# Patient Record
(demographics unavailable — no encounter records)

---

## 2024-10-24 NOTE — PHYSICAL EXAM

## 2024-10-24 NOTE — HISTORY OF PRESENT ILLNESS
[Normal] : Normal [No] : No cigarette smoke exposure [Water heater temperature set at <120 degrees F] : Water heater temperature set at <120 degrees F [Car seat in back seat] : Car seat in back seat [Carbon Monoxide Detectors] : Carbon monoxide detectors [Smoke Detectors] : Smoke detectors [Supervised outdoor play] : Supervised outdoor play [Yes] : Patient goes to dentist yearly [FreeTextEntry7] :  Patient is here for wcc. [FreeTextEntry1] : g and t  ps 122   got flu shot  got prednisone for a few days albuterol and  budesonide sees specialist in [place]  open adoption  meds only when sick no regular meds

## 2024-10-24 NOTE — DISCUSSION/SUMMARY
[Normal Growth] : growth [Normal Development] : development  [No Elimination Concerns] : elimination [Continue Regimen] : feeding [No Skin Concerns] : skin [Normal Sleep Pattern] : sleep [None] : no medical problems [School Readiness] : school readiness [Mental Health] : mental health [Nutrition and Physical Activity] : nutrition and physical activity [Oral Health] : oral health [Safety] : safety [Anticipatory Guidance Given] : Anticipatory guidance addressed as per the history of present illness section [No Vaccines] : no vaccines needed [No Medications] : ~He/She~ is not on any medications [] : The components of the vaccine(s) to be administered today are listed in the plan of care. The disease(s) for which the vaccine(s) are intended to prevent and the risks have been discussed with the caretaker.  The risks are also included in the appropriate vaccination information statements which have been provided to the patient's caregiver.  The caregiver has given consent to vaccinate. [FreeTextEntry1] : Continue balanced diet with all food groups. Brush teeth twice a day with toothbrush. Recommend visit to dentist. As per car seat 's guidelines, use foward-facing booster seat until child reaches highest weight/height for seat. Child needs to ride in a belt-positioning booster seat until  4 feet 9 inches has been reached and are between 8 and 12 years of age. Put child to sleep in own bed. Help child to maintain consistent daily routines and sleep schedule.  discussed. Ensure home is safe. Teach child about personal safety. Use consistent, positive discipline. Read aloud to child. Limit screen time to no more than 2 hours per day. Return 1 year for routine well child check. vaccines not given due to steroid use within one week   wart on finger  will freeze with cry opreicpate for 5 seconds

## 2024-11-18 NOTE — HISTORY OF PRESENT ILLNESS
[de-identified] : Cough  [FreeTextEntry6] : Olga is a 4 y.o female presenting with cough, sore throat, nausea, abdominal pain and rash. Symptoms started 4 days ago- with fever and other symptoms have emerged since then. Rash is rough and on chest- started one day ago.

## 2024-11-18 NOTE — PHYSICAL EXAM
[Clear Rhinorrhea] : clear rhinorrhea [Erythematous Oropharynx] : erythematous oropharynx [Vesicles] : vesicles present [Exudate] : exudate [Palate petechiae] : palate petechiae [NL] : moves all extremities x4, warm, well perfused x4 [de-identified] : + rough textured papular rash on chest

## 2024-11-18 NOTE — DISCUSSION/SUMMARY
[FreeTextEntry1] : Olga is a 4 y.o female presenting with cough + other symptoms. Physical exam with pharyngitis and sandpaper rash on chest. Rapid strep testing negative but unable to get optimal swab( patient unable to tolerate testing)- throat culture sent out and will follow up. Discussed diagnosis of pharyngitis and starting Amoxicillin until throat culture results given high clinical suspicion of GAS and mother endorsed understanding. RTO as needed- will follow up with throat culture results.

## 2024-11-18 NOTE — REVIEW OF SYSTEMS
[Fever] : fever [Sore Throat] : sore throat [Cough] : cough [Appetite Changes] : appetite changes [Abdominal Pain] : abdominal pain [Rash] : rash [Negative] : Genitourinary

## 2025-01-22 NOTE — HISTORY OF PRESENT ILLNESS
[de-identified] : Immunizations [FreeTextEntry6] : Olga is a 5 y.o female presenting for 4 yr vaccines. No other symptoms.

## 2025-01-22 NOTE — DISCUSSION/SUMMARY
[FreeTextEntry1] : Olga is a 5 y.o female presenting for 4 yr vaccines. MMR, Varicella, Dtap and Polio vaccines administered with no issues. RTO as needed.  [] : The components of the vaccine(s) to be administered today are listed in the plan of care. The disease(s) for which the vaccine(s) are intended to prevent and the risks have been discussed with the caretaker.  The risks are also included in the appropriate vaccination information statements which have been provided to the patient's caregiver.  The caregiver has given consent to vaccinate.

## 2025-02-12 NOTE — HISTORY OF PRESENT ILLNESS
[de-identified] : Rash  [FreeTextEntry6] : Olga is a 5 y.o female presenting for rash on face and body. With red rash- not itchy on face and now spreading to body. Some congestion. No fever or other symptoms.

## 2025-02-12 NOTE — PHYSICAL EXAM
[NL] : moves all extremities x4, warm, well perfused x4 [de-identified] : + blanching maculopapular rash on body + face

## 2025-02-12 NOTE — DISCUSSION/SUMMARY
[FreeTextEntry1] : Olga is a 5 y.o female presenting for rash. Physical exam notable for viral exanthem. Discussed that most likely etiology is viral URI. Discussed supportive care and RTO precautions. RTO as needed.

## 2025-02-12 NOTE — PHYSICAL EXAM
[NL] : moves all extremities x4, warm, well perfused x4 [de-identified] : + blanching maculopapular rash on body + face

## 2025-02-12 NOTE — HISTORY OF PRESENT ILLNESS
[de-identified] : Rash  [FreeTextEntry6] : Olga is a 5 y.o female presenting for rash on face and body. With red rash- not itchy on face and now spreading to body. Some congestion. No fever or other symptoms.

## 2025-03-15 NOTE — DISCUSSION/SUMMARY
[FreeTextEntry1] : Olga is a 5 y.o female presenting for follow up skin and hepatitis B vaccine. Discussed gentle skin care- no focal findings. Hepatitis B vaccine administered with no issues.  [] : The components of the vaccine(s) to be administered today are listed in the plan of care. The disease(s) for which the vaccine(s) are intended to prevent and the risks have been discussed with the caretaker.  The risks are also included in the appropriate vaccination information statements which have been provided to the patient's caregiver.  The caregiver has given consent to vaccinate.